# Patient Record
Sex: MALE | Race: WHITE
[De-identification: names, ages, dates, MRNs, and addresses within clinical notes are randomized per-mention and may not be internally consistent; named-entity substitution may affect disease eponyms.]

---

## 2019-01-31 ENCOUNTER — HOSPITAL ENCOUNTER (OUTPATIENT)
Dept: HOSPITAL 88 - ENDO | Age: 64
Discharge: HOME | End: 2019-01-31
Attending: INTERNAL MEDICINE
Payer: COMMERCIAL

## 2019-01-31 VITALS — SYSTOLIC BLOOD PRESSURE: 103 MMHG | DIASTOLIC BLOOD PRESSURE: 70 MMHG

## 2019-01-31 DIAGNOSIS — D12.3: ICD-10-CM

## 2019-01-31 DIAGNOSIS — Z12.11: Primary | ICD-10-CM

## 2019-01-31 DIAGNOSIS — D12.4: ICD-10-CM

## 2019-01-31 DIAGNOSIS — K64.8: ICD-10-CM

## 2019-01-31 DIAGNOSIS — K62.1: ICD-10-CM

## 2019-01-31 DIAGNOSIS — I10: ICD-10-CM

## 2019-01-31 DIAGNOSIS — Z79.4: ICD-10-CM

## 2019-01-31 DIAGNOSIS — I44.4: ICD-10-CM

## 2019-01-31 DIAGNOSIS — Z01.810: ICD-10-CM

## 2019-01-31 DIAGNOSIS — E11.9: ICD-10-CM

## 2019-01-31 DIAGNOSIS — Z79.82: ICD-10-CM

## 2019-01-31 PROCEDURE — 82948 REAGENT STRIP/BLOOD GLUCOSE: CPT

## 2019-01-31 PROCEDURE — 36415 COLL VENOUS BLD VENIPUNCTURE: CPT

## 2019-01-31 PROCEDURE — 45385 COLONOSCOPY W/LESION REMOVAL: CPT

## 2019-01-31 PROCEDURE — 93005 ELECTROCARDIOGRAM TRACING: CPT

## 2019-01-31 PROCEDURE — 45378 DIAGNOSTIC COLONOSCOPY: CPT

## 2019-01-31 PROCEDURE — 45384 COLONOSCOPY W/LESION REMOVAL: CPT

## 2019-01-31 NOTE — OPERATIVE REPORT
DATE OF PROCEDURE:  January 31, 2019 



REFERRING PHYSICIAN:  Dr. Preet Lebron 



PROCEDURE PERFORMED:  Colonoscopy and polypectomy.  



INDICATIONS FOR COLONOSCOPY:  Colorectal cancer screening.



MEDICATION:  Patient was done under MAC.  Please see anesthesiologist's 

note.



PROCEDURE:  With the patient in the left lateral decubitus position, the 

flexible fiberoptic Olympus colonoscope was inserted into the rectum with 

ease and advanced all the way to the cecum.  The scope was then withdrawn 

slowly.  Mucosa overlying the cecum and ascending colon appeared to be 

within normal limits.  One polyp was hot biopsied and 2 polyps were snared 

from the transverse colon.  One polyp was snared from the descending colon. 

 One polyp was hot biopsied from the sigmoid colon.  Two polyps were snared 

from the rectum.  The scope was then retroflexed into the distal rectum, 

and small internal hemorrhoids were noted, none of which was actively 

bleeding.  The scope was then straightened out.  It was subsequently 

withdrawn.  Patient tolerated the procedure well.



IMPRESSION  

1. Transverse colon polyps x3, two snared and one hot biopsied.

2. Descending colon polyp x1 snared.

3. Sigmoid colon polyp x1, hot biopsied.

4. Rectal polyps x2, snared.

5. Internal hemorrhoids, none actively bleeding.





PLAN:  Follow up histology.  Initiate high-fiber, low-fat diet.  Initiate 

high-fiber supplement.  A total of 7 polyps were removed.  Patient might 

benefit from a followup colonoscopy in 3 years.  



  



DD:  01/31/2019 08:47

DT:  01/31/2019 09:28

Job#:  [id number] 



cc:MARISELA LEBRON DO

## 2019-01-31 NOTE — NUR
SPIRITUAL CARE - Pre-Surgery



Assessment:  Pt in bed. Pt's wife at bedside. Pt reported supportive attention from family 
and friends. 

Intervention: I provided pastoral presence, hospitality, prayer, and sympathetic listening.  
I acquainted pt with availability of  while hospitalized.

Outcome: Pt expressed appreciation for visit. No need for follow up indicated at this time. 



SHWETA JENNINGS



Spiritual Care Department

O: [phone]

Pager: [phone] ([phone])